# Patient Record
Sex: FEMALE | Race: BLACK OR AFRICAN AMERICAN | Employment: FULL TIME | URBAN - METROPOLITAN AREA
[De-identification: names, ages, dates, MRNs, and addresses within clinical notes are randomized per-mention and may not be internally consistent; named-entity substitution may affect disease eponyms.]

---

## 2017-05-04 ENCOUNTER — TELEPHONE (OUTPATIENT)
Dept: OBGYN CLINIC | Facility: CLINIC | Age: 47
End: 2017-05-04

## 2017-05-04 RX ORDER — METRONIDAZOLE 500 MG/1
500 TABLET ORAL 2 TIMES DAILY
Qty: 14 TABLET | Refills: 0 | Status: SHIPPED | OUTPATIENT
Start: 2017-05-04 | End: 2017-05-11

## 2017-05-04 NOTE — TELEPHONE ENCOUNTER
SPOKE WITH PATIENT REGARDING REFILL OF FLAGYL TOLD PATIENT IF CONTINUES PLEASE MAKE APPT TO BE SEEN AND ALSO LET HER KNOW NO DRINKING WITH THIS ANTIBOTIC

## 2017-07-14 ENCOUNTER — TELEPHONE (OUTPATIENT)
Dept: OBGYN CLINIC | Facility: CLINIC | Age: 47
End: 2017-07-14

## 2017-07-14 RX ORDER — METRONIDAZOLE 500 MG/1
500 TABLET ORAL 2 TIMES DAILY
Qty: 14 TABLET | Refills: 0 | Status: SHIPPED | OUTPATIENT
Start: 2017-07-14 | End: 2017-07-21

## 2017-07-14 NOTE — TELEPHONE ENCOUNTER
Pt c/o vaginal irritation along with slight milky discharge. No odor. Treated for BV in May 2017. Instructed may be yeast or reoccurrance of BV. Prescription for Flagyl sent to pharmacy.   Pt decided to try over the counter yeast treatment, if no results wi

## 2017-11-09 ENCOUNTER — TELEPHONE (OUTPATIENT)
Dept: OBGYN CLINIC | Facility: CLINIC | Age: 47
End: 2017-11-09

## 2017-11-09 NOTE — TELEPHONE ENCOUNTER
Pt c/o symptoms of UTI with frequency and painful urination. Currently using AZO with some relief. Appointment made for this Fri 11/10.

## 2017-11-10 ENCOUNTER — OFFICE VISIT (OUTPATIENT)
Dept: OBGYN CLINIC | Facility: CLINIC | Age: 47
End: 2017-11-10

## 2017-11-10 VITALS
DIASTOLIC BLOOD PRESSURE: 86 MMHG | SYSTOLIC BLOOD PRESSURE: 120 MMHG | HEIGHT: 65 IN | BODY MASS INDEX: 31.65 KG/M2 | WEIGHT: 190 LBS

## 2017-11-10 DIAGNOSIS — N95.2 ATROPHIC VAGINITIS: ICD-10-CM

## 2017-11-10 DIAGNOSIS — R31.9 URINARY TRACT INFECTION WITH HEMATURIA, SITE UNSPECIFIED: Primary | ICD-10-CM

## 2017-11-10 DIAGNOSIS — N39.0 URINARY TRACT INFECTION WITH HEMATURIA, SITE UNSPECIFIED: Primary | ICD-10-CM

## 2017-11-10 DIAGNOSIS — N30.01 ACUTE CYSTITIS WITH HEMATURIA: ICD-10-CM

## 2017-11-10 PROCEDURE — 99213 OFFICE O/P EST LOW 20 MIN: CPT | Performed by: OBSTETRICS & GYNECOLOGY

## 2017-11-10 PROCEDURE — 87086 URINE CULTURE/COLONY COUNT: CPT | Performed by: OBSTETRICS & GYNECOLOGY

## 2017-11-10 PROCEDURE — 81002 URINALYSIS NONAUTO W/O SCOPE: CPT | Performed by: OBSTETRICS & GYNECOLOGY

## 2017-11-10 RX ORDER — CIPROFLOXACIN 250 MG/1
500 TABLET, FILM COATED ORAL 2 TIMES DAILY
Qty: 6 TABLET | Refills: 3 | Status: SHIPPED | OUTPATIENT
Start: 2017-11-10 | End: 2017-11-13

## 2017-11-10 NOTE — PROGRESS NOTES
CC: Patient is here for urinary c/o and pain with intercourse    HPI: Patient is a 50year old [id number] for 2 day h/o pain, frequency and small amount in urine. + last UTI some months ago. No fever or chills.  She feels like she gets about 2 - 3 UTI's in a y /86   Ht 65\"   Wt 190 lb   BMI 31.62 kg/m²       Exam:         A/P: Patient is 50year old female     1. Urinary tract infection with hematuria, site unspecified  - URINALYSIS NONAUTO W/O SCOPE  - URINE CULTURE, ROUTINE; Future    2.  Acute

## 2018-08-05 ENCOUNTER — OFFICE VISIT (OUTPATIENT)
Dept: FAMILY MEDICINE CLINIC | Facility: CLINIC | Age: 48
End: 2018-08-05
Payer: COMMERCIAL

## 2018-08-05 VITALS
OXYGEN SATURATION: 98 % | SYSTOLIC BLOOD PRESSURE: 100 MMHG | DIASTOLIC BLOOD PRESSURE: 70 MMHG | HEIGHT: 65 IN | HEART RATE: 70 BPM | BODY MASS INDEX: 30.82 KG/M2 | RESPIRATION RATE: 16 BRPM | TEMPERATURE: 97 F | WEIGHT: 185 LBS

## 2018-08-05 DIAGNOSIS — Z91.09 SENSITIVITY TO SUNLIGHT: ICD-10-CM

## 2018-08-05 DIAGNOSIS — L29.9 ITCHING: Primary | ICD-10-CM

## 2018-08-05 PROCEDURE — 99202 OFFICE O/P NEW SF 15 MIN: CPT | Performed by: NURSE PRACTITIONER

## 2018-08-05 NOTE — PATIENT INSTRUCTIONS
Sunburn  A sunburn is an injury to the skin. It is caused by over-exposure to ultraviolet (UV) light from the sun. The skin becomes pink or red and painful. Very severe sunburns may cause blistering and fluid draining from the skin.  Open blisters may bec · You can take over-the-counter medicine for pain, unless you were given a different pain medicine to use. Talk with your provider before using these medicines if you have chronic liver or kidney disease, ever had a stomach ulcer or GI bleeding, or are debra · Use sunscreen on your skin. Put sunscreen on 15 to 20 minutes before going out in the sun. This gives the sunscreen time to interact with your skin. Reapply sunscreen every 1 to 2 hours. Reapply it sooner if it is washed away by sweat or water.  Use a sun

## 2018-08-05 NOTE — PROGRESS NOTES
CHIEF COMPLAINT:   Patient presents with:  Itching: X 2 days,         HPI:   Debbie Cheng is a 46year old female who presents for evaluation of a rash. Per patient rash started in the past 2 days. Rash has been worsened since onset.   Patient  had s NEURO: Denies abnormal sensation, tingling of the skin, or numbness.       EXAM:   /70   Pulse 70   Temp 96.8 °F (36 °C) (Tympanic)   Resp 16   Ht 65\"   Wt 185 lb   SpO2 98%   BMI 30.79 kg/m²   GENERAL: well developed, well nourished,in no apparent d Pt worried about itching, discussed, benadryl while not working or operating heavy machinery, may try second generation antihistamine during work hours.  Discussed sarna or calamine lotion for itching, aloe gel OTC to soothe skin and topical burn cream OTC · Place an ice pack over the injured area. Do this for 20 minutes every 1 to 2 hours the first day for pain relief. To make an ice pack, put ice cubes in a plastic bag that seals at the top. Wrap the bag in a clean, thin towel or cloth.  Never put ice or an · Antibiotics are usually not given unless there is an infection. If you were prescribed antibiotics, take them until they are finished. It is important to finish the antibiotics even if the burn looks better. This will ensure the infection has cleared.   P When to seek medical advice  Call your healthcare provider right away if any of these occur:  · Pain that gets worse  · Increasing redness, or red streaks leading away from an open blister  · Swelling or pus coming from open blisters  · Upset stomach (naus

## 2019-08-05 ENCOUNTER — OFFICE VISIT (OUTPATIENT)
Dept: OBGYN CLINIC | Facility: CLINIC | Age: 49
End: 2019-08-05
Payer: COMMERCIAL

## 2019-08-05 VITALS
HEIGHT: 65 IN | WEIGHT: 192 LBS | DIASTOLIC BLOOD PRESSURE: 72 MMHG | SYSTOLIC BLOOD PRESSURE: 114 MMHG | BODY MASS INDEX: 31.99 KG/M2

## 2019-08-05 DIAGNOSIS — D25.2 INTRAMURAL, SUBMUCOUS, AND SUBSEROUS LEIOMYOMA OF UTERUS: Primary | ICD-10-CM

## 2019-08-05 DIAGNOSIS — N94.10 DYSPAREUNIA, FEMALE: ICD-10-CM

## 2019-08-05 DIAGNOSIS — D25.1 INTRAMURAL, SUBMUCOUS, AND SUBSEROUS LEIOMYOMA OF UTERUS: Primary | ICD-10-CM

## 2019-08-05 DIAGNOSIS — D25.0 INTRAMURAL, SUBMUCOUS, AND SUBSEROUS LEIOMYOMA OF UTERUS: Primary | ICD-10-CM

## 2019-08-05 PROCEDURE — 99213 OFFICE O/P EST LOW 20 MIN: CPT | Performed by: OBSTETRICS & GYNECOLOGY

## 2019-08-05 NOTE — PROGRESS NOTES
CC: Patient is here for FU for fibroids/meds. Last seen 2017. Patient of Dr. Dez Cardenas. HPI: Patient is a 44year old [id number] for refill for Osphena. Patient's PCP wanted her to get USN before refilling and noted fibroids. Patient did not bring report Smoker      Smokeless tobacco: Never Used    Substance and Sexual Activity      Alcohol use:  Yes        Alcohol/week: 0.0 standard drinks        Comment: socially      Drug use: No      Sexual activity: Yes        Partners: Male    Lifestyle      Physical wnl, no palpable tenderness or masses, no discharge  Vagina: normal pink mucosa, no lesions, normal clear discharge.    Uterus: 8 W size fibroid uterus  Cervix: pink, no lesions grossly visible, no discharge  Adnexa: non tender, no palpable masses, normal s

## 2020-03-11 ENCOUNTER — TELEPHONE (OUTPATIENT)
Dept: OBGYN CLINIC | Facility: CLINIC | Age: 50
End: 2020-03-11

## 2020-03-11 NOTE — TELEPHONE ENCOUNTER
Pt has had vag irritation; pt denies: fever, chills, vag discharge, frequent urination, hematuria. Sx x 2 weeks. This RN scheduled pt with LC for tomorrow. Pt verbalized understanding and agrees with POC.

## 2020-03-12 ENCOUNTER — OFFICE VISIT (OUTPATIENT)
Dept: OBGYN CLINIC | Facility: CLINIC | Age: 50
End: 2020-03-12
Payer: COMMERCIAL

## 2020-03-12 VITALS
SYSTOLIC BLOOD PRESSURE: 120 MMHG | BODY MASS INDEX: 32.15 KG/M2 | HEIGHT: 65 IN | DIASTOLIC BLOOD PRESSURE: 80 MMHG | WEIGHT: 193 LBS

## 2020-03-12 DIAGNOSIS — Z12.31 ENCOUNTER FOR SCREENING MAMMOGRAM FOR MALIGNANT NEOPLASM OF BREAST: ICD-10-CM

## 2020-03-12 DIAGNOSIS — N76.0 VAGINITIS AND VULVOVAGINITIS: ICD-10-CM

## 2020-03-12 DIAGNOSIS — Z01.419 ENCOUNTER FOR GYNECOLOGICAL EXAMINATION WITHOUT ABNORMAL FINDING: Primary | ICD-10-CM

## 2020-03-12 LAB
APPEARANCE: CLEAR
BILIRUBIN: NEGATIVE
GLUCOSE (URINE DIPSTICK): NEGATIVE MG/DL
KETONES (URINE DIPSTICK): NEGATIVE MG/DL
MULTISTIX EXPIRATION DATE: NORMAL DATE
MULTISTIX LOT#: NORMAL NUMERIC
NITRITE, URINE: NEGATIVE
OCCULT BLOOD: NEGATIVE
PH, URINE: 7 (ref 4.5–8)
PROTEIN (URINE DIPSTICK): NEGATIVE MG/DL
SPECIFIC GRAVITY: 1.01 (ref 1–1.03)
URINE-COLOR: YELLOW
UROBILINOGEN,SEMI-QN: 0.2 MG/DL (ref 0–1.9)

## 2020-03-12 PROCEDURE — 87624 HPV HI-RISK TYP POOLED RSLT: CPT | Performed by: OBSTETRICS & GYNECOLOGY

## 2020-03-12 PROCEDURE — 87086 URINE CULTURE/COLONY COUNT: CPT | Performed by: OBSTETRICS & GYNECOLOGY

## 2020-03-12 PROCEDURE — 87106 FUNGI IDENTIFICATION YEAST: CPT | Performed by: OBSTETRICS & GYNECOLOGY

## 2020-03-12 PROCEDURE — 99396 PREV VISIT EST AGE 40-64: CPT | Performed by: OBSTETRICS & GYNECOLOGY

## 2020-03-12 PROCEDURE — 88175 CYTOPATH C/V AUTO FLUID REDO: CPT | Performed by: OBSTETRICS & GYNECOLOGY

## 2020-03-12 PROCEDURE — 81002 URINALYSIS NONAUTO W/O SCOPE: CPT | Performed by: OBSTETRICS & GYNECOLOGY

## 2020-03-12 PROCEDURE — 87808 TRICHOMONAS ASSAY W/OPTIC: CPT | Performed by: OBSTETRICS & GYNECOLOGY

## 2020-03-12 PROCEDURE — 87205 SMEAR GRAM STAIN: CPT | Performed by: OBSTETRICS & GYNECOLOGY

## 2020-03-12 RX ORDER — OSPEMIFENE 60 MG/1
TABLET, FILM COATED ORAL
COMMUNITY
Start: 2020-03-09 | End: 2020-09-02

## 2020-03-12 NOTE — PROGRESS NOTES
CC: Patient is here for     HPI: Patient is a 44year old [id number] for external irritation x 2 W no treatment. Minimal discharge. No odor. No recent abx. No recent ABX. Using osphena for dyspareunia       No LMP recorded. Patient has had an ablation. Yes        Alcohol/week: 0.0 standard drinks        Comment: socially      Drug use: No      Sexual activity: Yes        Partners: Male        Comment: Ablation    Lifestyle      Physical activity:        Days per week: Not on file        Minutes per sessi discharge  Vagina: normal pink mucosa, no lesions, normal clear discharge. Cervix: pink, no lesions grossly visible, no discharge  Anus:  No lesions or visible hemorrhoids        A/P: Patient is 44year old female     1.  Encounter for gynecological exami

## 2020-03-13 LAB — HPV I/H RISK 1 DNA SPEC QL NAA+PROBE: NEGATIVE

## 2020-03-14 LAB
GENITAL VAGINOSIS SCREEN: NEGATIVE
TRICHOMONAS SCREEN: NEGATIVE

## 2020-03-16 ENCOUNTER — TELEPHONE (OUTPATIENT)
Dept: OBGYN CLINIC | Facility: CLINIC | Age: 50
End: 2020-03-16

## 2020-09-02 RX ORDER — OSPEMIFENE 60 MG/1
TABLET, FILM COATED ORAL
Qty: 30 TABLET | Refills: 11 | Status: SHIPPED | OUTPATIENT
Start: 2020-09-02 | End: 2021-01-29

## 2020-10-14 ENCOUNTER — HOSPITAL ENCOUNTER (OUTPATIENT)
Dept: MAMMOGRAPHY | Age: 50
Discharge: HOME | End: 2020-10-14
Attending: OBSTETRICS & GYNECOLOGY
Payer: COMMERCIAL

## 2020-10-14 DIAGNOSIS — Z12.31 ENCOUNTER FOR SCREENING MAMMOGRAM FOR MALIGNANT NEOPLASM OF BREAST: ICD-10-CM

## 2020-10-14 PROCEDURE — 77063 BREAST TOMOSYNTHESIS BI: CPT | Performed by: OBSTETRICS & GYNECOLOGY

## 2020-10-14 PROCEDURE — 77067 SCR MAMMO BI INCL CAD: CPT | Performed by: OBSTETRICS & GYNECOLOGY

## 2021-01-27 ENCOUNTER — TELEPHONE (OUTPATIENT)
Dept: OBGYN CLINIC | Facility: CLINIC | Age: 51
End: 2021-01-27

## 2021-01-27 NOTE — TELEPHONE ENCOUNTER
Medication Quantity Refills Start End   OSPHENA 60 MG Oral Tab 30 tablet 11 9/2/2020    Sig:   TAKE 1 TABLET BY MOUTH EVERY DAY     Route:   (none)     Order #:   [id number]             Last visit 03/12/20 1923 [facility]  Using osphena for dyspareunia

## 2021-01-27 NOTE — TELEPHONE ENCOUNTER
Patient states her insurance no longer covers Osphena 60mg and was told they do cover Estradiol. Would like to know if she can have a prescription sent to her pharmacy. Confirmed that we have the right pharmacy.

## 2021-01-29 RX ORDER — ESTRADIOL 0.1 MG/G
CREAM VAGINAL
Qty: 1 TUBE | Refills: 5 | Status: SHIPPED | OUTPATIENT
Start: 2021-01-29 | End: 2021-10-19

## 2021-10-19 ENCOUNTER — OFFICE VISIT (OUTPATIENT)
Dept: OBGYN CLINIC | Facility: CLINIC | Age: 51
End: 2021-10-19
Payer: COMMERCIAL

## 2021-10-19 VITALS
HEIGHT: 65 IN | BODY MASS INDEX: 31.49 KG/M2 | SYSTOLIC BLOOD PRESSURE: 120 MMHG | WEIGHT: 189 LBS | DIASTOLIC BLOOD PRESSURE: 80 MMHG

## 2021-10-19 DIAGNOSIS — Z01.419 ENCOUNTER FOR GYNECOLOGICAL EXAMINATION WITHOUT ABNORMAL FINDING: Primary | ICD-10-CM

## 2021-10-19 DIAGNOSIS — Z12.31 ENCOUNTER FOR SCREENING MAMMOGRAM FOR MALIGNANT NEOPLASM OF BREAST: ICD-10-CM

## 2021-10-19 DIAGNOSIS — N95.2 POST-MENOPAUSAL ATROPHIC VAGINITIS: ICD-10-CM

## 2021-10-19 PROCEDURE — 3079F DIAST BP 80-89 MM HG: CPT | Performed by: OBSTETRICS & GYNECOLOGY

## 2021-10-19 PROCEDURE — 3008F BODY MASS INDEX DOCD: CPT | Performed by: OBSTETRICS & GYNECOLOGY

## 2021-10-19 PROCEDURE — 3074F SYST BP LT 130 MM HG: CPT | Performed by: OBSTETRICS & GYNECOLOGY

## 2021-10-19 PROCEDURE — 99396 PREV VISIT EST AGE 40-64: CPT | Performed by: OBSTETRICS & GYNECOLOGY

## 2021-10-19 RX ORDER — OSPEMIFENE 60 MG/1
1 TABLET, FILM COATED ORAL DAILY
Qty: 90 TABLET | Refills: 3 | Status: SHIPPED | OUTPATIENT
Start: 2021-10-19 | End: 2022-10-19

## 2021-10-19 RX ORDER — OSPEMIFENE 60 MG/1
1 TABLET, FILM COATED ORAL DAILY
Qty: 90 TABLET | Refills: 3 | Status: SHIPPED | OUTPATIENT
Start: 2021-10-19 | End: 2021-10-19

## 2021-10-19 NOTE — PROGRESS NOTES
GYN H&P     10/19/2021  11:42 AM    CC: Patient is here for annual.     HPI: Patient is a 46year old [id number] for annual. S/P endometrial ablation    + c/o painful sex \"like scraping on an open wound\"  and low libido for a couple years.  Has had hot flash Drug use: No      Sexual activity: Yes        Partners: Male        Comment: Ablation    Social History    Social History Narrative      Live with       NO HISTORY OF ABUSE       Medications reviewed.  See active list.     /80   Ht 65\"   Wt 18

## 2021-10-27 ENCOUNTER — TELEPHONE (OUTPATIENT)
Dept: OBGYN CLINIC | Facility: CLINIC | Age: 51
End: 2021-10-27

## 2021-10-27 NOTE — TELEPHONE ENCOUNTER
Ospemifene (OSPHENA) 60 MG Oral Tab 90 tablet 3 10/19/2021 10/19/2022   Sig:   Take 1 tablet by mouth daily.      Route:   Oral     Class:   Print Script     Order #:   [id number]       [address] not cover by insurance alternative Estradiol

## 2022-05-31 ENCOUNTER — TELEPHONE (OUTPATIENT)
Dept: OBGYN CLINIC | Facility: CLINIC | Age: 52
End: 2022-05-31

## 2022-05-31 NOTE — TELEPHONE ENCOUNTER
RN spoke with pt. Pt states that Nehemias Rodríguez has not helped with the pain she feels during sex. RN sked pt for appt with LC on 6/22 to discuss other options. Pt verbalized understanding and agreed with POC.

## 2022-05-31 NOTE — TELEPHONE ENCOUNTER
Pt initially prescribed listed medication below by [facility] 10/2021 to address pain during intercourse. Pt states medication has not seemingly helped at all and would like to know if [facility] can prescribe an alternative. Pt states she has not had any adverse reactions or symptoms while using, it just has little to no efficacy for her at all.     Ospemifene (OSPHENA) 60 MG Oral Tab

## 2022-06-22 ENCOUNTER — OFFICE VISIT (OUTPATIENT)
Dept: OBGYN CLINIC | Facility: CLINIC | Age: 52
End: 2022-06-22
Payer: COMMERCIAL

## 2022-06-22 VITALS — SYSTOLIC BLOOD PRESSURE: 122 MMHG | HEIGHT: 65 IN | DIASTOLIC BLOOD PRESSURE: 80 MMHG | BODY MASS INDEX: 31 KG/M2

## 2022-06-22 DIAGNOSIS — N95.2 POST-MENOPAUSAL ATROPHIC VAGINITIS: Primary | ICD-10-CM

## 2022-06-22 PROCEDURE — 3079F DIAST BP 80-89 MM HG: CPT | Performed by: OBSTETRICS & GYNECOLOGY

## 2022-06-22 PROCEDURE — 99212 OFFICE O/P EST SF 10 MIN: CPT | Performed by: OBSTETRICS & GYNECOLOGY

## 2022-06-22 PROCEDURE — 3074F SYST BP LT 130 MM HG: CPT | Performed by: OBSTETRICS & GYNECOLOGY

## 2022-06-22 RX ORDER — ESTROGEN,CON/M-PROGEST ACET 0.45-1.5MG
1 TABLET ORAL DAILY
Qty: 60 TABLET | Refills: 6 | Status: SHIPPED | OUTPATIENT
Start: 2022-06-22 | End: 2023-06-22

## 2022-06-24 ENCOUNTER — TELEPHONE (OUTPATIENT)
Dept: OBGYN CLINIC | Facility: CLINIC | Age: 52
End: 2022-06-24

## 2022-06-24 NOTE — TELEPHONE ENCOUNTER
Requested Prescriptions      No prescriptions requested or ordered in this encounter        Last office visit: 06/22/2022     Date last refilled: 06/22/2022    Prescribed Qty: 60 Tablets 6 refills    Sig: Take 1   Tablet by mouth daily    Was pt notified: No    Next appointment due: 06/22/2023    ALTERNATIVE RX REQUEST NEEDED:     Spoke with pharmacy Prempro 0.45-1.5MG tablets are not covered under pt's insurance and there is no generic available. Pharmacist stated rx can be written as two prescription Estrogen and medroxyprogesterone.

## 2022-06-30 ENCOUNTER — PATIENT MESSAGE (OUTPATIENT)
Dept: OBGYN CLINIC | Facility: CLINIC | Age: 52
End: 2022-06-30

## 2022-06-30 RX ORDER — ESTRADIOL AND NORETHINDRONE ACETATE .5; .1 MG/1; MG/1
1 TABLET ORAL DAILY
Qty: 90 TABLET | Refills: 3 | Status: SHIPPED | OUTPATIENT
Start: 2022-06-30

## 2022-06-30 NOTE — TELEPHONE ENCOUNTER
Lorrayne Councilman, RN 6/30/2022 2:47 PM CDT      ----- Message -----  From: Angelina Monzon  Sent: 6/30/2022 2:19 PM CDT  To: [facility] Clinical Staff  Subject: Need new prescription     Hi Dr. Marisa Leong. My insurance does not cover Prempro. Can you send a prescription for the generic version (hopefully there is one) to [facility] in [place].  Thank you

## 2023-08-01 ENCOUNTER — OFFICE VISIT (OUTPATIENT)
Dept: FAMILY MEDICINE CLINIC | Facility: CLINIC | Age: 53
End: 2023-08-01

## 2023-08-01 VITALS
DIASTOLIC BLOOD PRESSURE: 82 MMHG | TEMPERATURE: 98 F | RESPIRATION RATE: 16 BRPM | BODY MASS INDEX: 31 KG/M2 | HEART RATE: 59 BPM | WEIGHT: 184 LBS | SYSTOLIC BLOOD PRESSURE: 130 MMHG

## 2023-08-01 DIAGNOSIS — Z00.00 ROUTINE PHYSICAL EXAMINATION: Primary | ICD-10-CM

## 2023-08-01 DIAGNOSIS — Z01.419 ENCOUNTER FOR ANNUAL ROUTINE GYNECOLOGICAL EXAMINATION: ICD-10-CM

## 2023-08-01 DIAGNOSIS — N95.2 ATROPHIC VAGINITIS: ICD-10-CM

## 2023-08-01 DIAGNOSIS — Z12.31 ENCOUNTER FOR SCREENING MAMMOGRAM FOR BREAST CANCER: ICD-10-CM

## 2023-08-01 PROCEDURE — 3079F DIAST BP 80-89 MM HG: CPT | Performed by: FAMILY MEDICINE

## 2023-08-01 PROCEDURE — 3075F SYST BP GE 130 - 139MM HG: CPT | Performed by: FAMILY MEDICINE

## 2023-08-01 PROCEDURE — 99386 PREV VISIT NEW AGE 40-64: CPT | Performed by: FAMILY MEDICINE

## 2023-08-01 RX ORDER — ESTRADIOL AND NORETHINDRONE ACETATE .5; .1 MG/1; MG/1
1 TABLET ORAL DAILY
Qty: 90 TABLET | Refills: 0 | Status: SHIPPED | OUTPATIENT
Start: 2023-08-01

## 2023-08-12 ENCOUNTER — HOSPITAL ENCOUNTER (OUTPATIENT)
Dept: MAMMOGRAPHY | Age: 53
Discharge: HOME | End: 2023-08-12
Attending: FAMILY MEDICINE
Payer: COMMERCIAL

## 2023-08-12 DIAGNOSIS — Z12.31 ENCOUNTER FOR SCREENING MAMMOGRAM FOR BREAST CANCER: ICD-10-CM

## 2023-08-12 PROCEDURE — 77063 BREAST TOMOSYNTHESIS BI: CPT | Performed by: FAMILY MEDICINE

## 2023-08-12 PROCEDURE — 77067 SCR MAMMO BI INCL CAD: CPT | Performed by: FAMILY MEDICINE

## 2023-08-15 LAB
HPV I/H RISK 1 DNA SPEC QL NAA+PROBE: NEGATIVE
LAST PAP RESULT: NORMAL
PAP HISTORY (OTHER THAN LAST PAP): NORMAL